# Patient Record
Sex: FEMALE | Race: WHITE | Employment: FULL TIME | URBAN - METROPOLITAN AREA
[De-identification: names, ages, dates, MRNs, and addresses within clinical notes are randomized per-mention and may not be internally consistent; named-entity substitution may affect disease eponyms.]

---

## 2017-11-27 ENCOUNTER — HOSPITAL ENCOUNTER (OUTPATIENT)
Dept: VASCULAR SURGERY | Age: 52
Discharge: HOME | End: 2017-11-27
Attending: NURSE PRACTITIONER
Payer: COMMERCIAL

## 2017-11-27 DIAGNOSIS — I77.9 DISEASE OF ARTERY (HCC): ICD-10-CM

## 2017-11-27 PROCEDURE — 93880 EXTRACRANIAL BILAT STUDY: CPT

## 2017-11-27 NOTE — PROCEDURES
Yuliya 87  *** FINAL REPORT ***    Name: Cammy Torres  MRN: [id number]    Outpatient  : 25 May 1965  HIS Order #: [id number]  [address] [facility] Visit #: [id number]  Date: 2017    TYPE OF TEST: Cerebrovascular Duplex    REASON FOR TEST  Sudden visual loss, Left Eye that resolved after 1 minute    Right Carotid:-             Proximal               Mid                 Distal  cm/s  Systolic  Diastolic  Systolic  Diastolic  Systolic  Diastolic  CCA:     50.7      19.3                            65.8      23.0  Bulb:  ECA:     63.6      12.1  ICA:     47.9      15.6       72.9      34.5       99.1      46.1  ICA/CCA:  0.7       0.7    ICA Stenosis: <50%    Right Vertebral:-  Finding: Antegrade  Sys:       37.6  Magnolia:       12.4    Right Subclavian:    Left Carotid:-            Proximal                Mid                 Distal  cm/s  Systolic  Diastolic  Systolic  Diastolic  Systolic  Diastolic  CCA:     00.9      28.0                            83.1      27.0  Bulb:  ECA:     74.3      12.8  ICA:     75.4      30.3       85.3      35.8      103.0      39.6  ICA/CCA:  0.9       1.1    ICA Stenosis: <50%    Left Vertebral:-  Finding: Antegrade  Sys:       46.4  Magnolia:       20.1    Left Subclavian:    INTERPRETATION/FINDINGS  PROCEDURE:  Evaluation of the extracranial cerebrovascular arteries  with ultrasound (B-mode imaging, pulsed Doppler, color Doppler). Includes the common carotid, internal carotid, external carotid, and  vertebral arteries. FINDINGS: Minimal homogeneous plaque in the bilateral bulb and  bilateral ICA. IMPRESSION: Findings are consistent with 0-49% stenosis of the right  internal carotid and 0-49% stenosis of the left internal carotid. Vertebrals are patent with antegrade flow. ADDITIONAL COMMENTS    INCIDENTAL FINDINGS: Nodules identified on bilateral lobes of the  thyroid. Alternate imaging method suggested.     I have personally reviewed the data relevant to the interpretation of  this  study. TECHNOLOGIST: SAM Verdugo  Signed: 11/27/2017 02:17 PM    PHYSICIAN: Jill Tony.  Alexander Oneil MD  Signed: 11/28/2017 09:15 AM

## 2017-11-29 ENCOUNTER — OFFICE VISIT (OUTPATIENT)
Dept: NEUROLOGY | Age: 52
End: 2017-11-29

## 2017-11-29 VITALS — OXYGEN SATURATION: 98 % | DIASTOLIC BLOOD PRESSURE: 70 MMHG | SYSTOLIC BLOOD PRESSURE: 108 MMHG | HEART RATE: 81 BPM

## 2017-11-29 DIAGNOSIS — H53.132 ACUTE LOSS OF VISION, LEFT: Primary | ICD-10-CM

## 2017-11-29 NOTE — MR AVS SNAPSHOT
Visit Information Date & Time Provider Department Dept. Phone Encounter #  
 11/29/2017 10:00 AM Hortensia Diego, [facility] [phone] [id number] Follow-up Instructions Return for review of results. Upcoming Health Maintenance Date Due Hepatitis C Screening 1965 DTaP/Tdap/Td series (1 - Tdap) 5/25/1985 PAP AKA CERVICAL CYTOLOGY 5/25/1985 BREAST CANCER SCRN MAMMOGRAM 5/25/2014 FOBT Q 1 YEAR AGE 50-75 5/25/2014 Influenza Age 5 to Adult 8/1/2017 Allergies as of 11/29/2017  Review Complete On: 11/29/2017 By: Hortensia Diego MD  
 No Known Allergies Current Immunizations  Never Reviewed No immunizations on file. Not reviewed this visit You Were Diagnosed With   
  
 Codes Comments Acute loss of vision, left    -  Primary ICD-10-CM: H53.132 ICD-9-CM: 368.11 Vitals BP Pulse SpO2 Smoking Status 108/70 81 98% Never Smoker Your Updated Medication List  
  
   
This list is accurate as of: 11/29/17 10:21 AM.  Always use your most recent med list.  
  
  
  
  
 MULTI VITAMIN PO Take  by mouth. Follow-up Instructions Return for review of results. To-Do List   
 11/29/2017 ECHO:  ECHO 2D ADULT Patient Instructions PRESCRIPTION REFILL POLICY [facility] Statement to Patients April 1, 2014 In an effort to ensure the large volume of patient prescription refills is processed in the most efficient and expeditious manner, we are asking our patients to assist us by calling your Pharmacy for all prescription refills, this will include also your  Mail Order Pharmacy. The pharmacy will contact our office electronically to continue the refill process. Please do not wait until the last minute to call your pharmacy. We need at least 48 hours (2days) to fill prescriptions.  We also encourage you to call your pharmacy before going to  your prescription to make sure it is ready. With regard to controlled substance prescription refill requests (narcotic refills) that need to be picked up at our office, we ask your cooperation by providing us with at least 72 hours (3days) notice that you will need a refill. We will not refill narcotic prescription refill requests after 4:00pm on any weekday, Monday through Thursday, or after 2:00pm on Fridays, or on the weekends. We encourage everyone to explore another way of getting your prescription refill request processed using [employer], our patient web portal through our electronic medical record system. [employer] is an efficient and effective way to communicate your medication request directly to the office and  downloadable as an yumiko on your smart phone . [employer] also features a review functionality that allows you to view your medication list as well as leave messages for your physician. Are you ready to get connected? If so please review the attatched instructions or speak to any of our staff to get you set up right away! Thank you so much for your cooperation. Should you have any questions please contact our Practice Administrator. The Physicians and Staff,  [facility] Introducing [facility] SERVICES! [facility] introduces [employer] patient portal. Now you can access parts of your medical record, email your doctor's office, and request medication refills online. 1. In your internet browser, go to [email or web address] 2. Click on the First Time User? Click Here link in the Sign In box. You will see the New Member Sign Up page. 3. Enter your [employer] Access Code exactly as it appears below. You will not need to use this code after youve completed the sign-up process. If you do not sign up before the expiration date, you must request a new code. · [employer] Access Code:  [email or web address] 
 Expires: 2/25/2018 11:28 AM 
 
4. Enter the last four digits of your Social Security Number (xxxx) and Date of Birth (mm/dd/yyyy) as indicated and click Submit. You will be taken to the next sign-up page. 5. Create a Number 100 ID. This will be your Number 100 login ID and cannot be changed, so think of one that is secure and easy to remember. 6. Create a Number 100 password. You can change your password at any time. 7. Enter your Password Reset Question and Answer. This can be used at a later time if you forget your password. 8. Enter your e-mail address. You will receive e-mail notification when new information is available in [address] 9. Click Sign Up. You can now view and download portions of your medical record. 10. Click the Download Summary menu link to download a portable copy of your medical information. If you have questions, please visit the Frequently Asked Questions section of the Number 100 website. Remember, [employer] 100 is NOT to be used for urgent needs. For medical emergencies, dial 911. Now available from your iPhone and Android! Please provide this summary of care documentation to your next provider. Your primary care clinician is listed as NONE. If you have any questions after today's visit, please call [phone].

## 2017-11-29 NOTE — PATIENT INSTRUCTIONS
[facility]   Statement to Patients  April 1, 2014      In an effort to ensure the large volume of patient prescription refills is processed in the most efficient and expeditious manner, we are asking our patients to assist us by calling your Pharmacy for all prescription refills, this will include also your  Mail Order Pharmacy. The pharmacy will contact our office electronically to continue the refill process. Please do not wait until the last minute to call your pharmacy. We need at least 48 hours (2days) to fill prescriptions. We also encourage you to call your pharmacy before going to  your prescription to make sure it is ready. With regard to controlled substance prescription refill requests (narcotic refills) that need to be picked up at our office, we ask your cooperation by providing us with at least 72 hours (3days) notice that you will need a refill. We will not refill narcotic prescription refill requests after 4:00pm on any weekday, Monday through Thursday, or after 2:00pm on Fridays, or on the weekends. We encourage everyone to explore another way of getting your prescription refill request processed using [employer], our patient web portal through our electronic medical record system. [employer] is an efficient and effective way to communicate your medication request directly to the office and  downloadable as an yumiko on your smart phone . [employer] also features a review functionality that allows you to view your medication list as well as leave messages for your physician. Are you ready to get connected? If so please review the attatched instructions or speak to any of our staff to get you set up right away! Thank you so much for your cooperation. Should you have any questions please contact our Practice Administrator.     The Physicians and Staff,  [facility]

## 2017-11-29 NOTE — PROGRESS NOTES
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 HPI:     2 weeks ago, was on a vacation on a cabin at [place], [place], around noon, had lunch, was just sitting and telling a story, when suddenly noted complete loss of vision of the L eye lasting for 45 to 60 secs . (-) eye pain (-) headache    (-) chest pain  (-) palpitation  (-) SOB  (-) dysarthria  (-) weakness/ numbness    Patient came homewas not feeling well, felt congested and middle back lower back pain. [facility] were said to be okay. Had no event since. ROS  All other systems reviewed and are negative  (-) fever    Past Medical Hx  History reviewed. No pertinent past medical history. Social Hx  Social History     Social History    Marital status:      Spouse name: N/A    Number of children: N/A    Years of education: N/A     Social History Main Topics    Smoking status: Never Smoker    Smokeless tobacco: Never Used    Alcohol use Yes    Drug use: None    Sexual activity: Not Asked     Other Topics Concern    None     Social History Narrative    None   1 glass of wine/ week    Family Hx  History reviewed. No pertinent family history. Stroke    ALLERGIES  No Known Allergies    CURRENT MEDS  Current Outpatient Prescriptions   Medication Sig Dispense Refill    MULTIVIT-MINERALS/FERROUS FUM (MULTI VITAMIN PO) Take  by mouth. PREVIOUS WORKUP: (reviewed)  IMAGING:    CT Results (recent):  No results found for this or any previous visit. MRI Results (recent):  No results found for this or any previous visit. IR Results (recent):  No results found for this or any previous visit.     VAS/US Results (recent):    Results from [facility] encounter on 11/27/17   DUPLEX CAROTID BILATERAL        LABS (reviewed)  No results found for this or any previous visit. Physical Exam:     Visit Vitals    /70    Pulse 81    SpO2 98%     General:  Alert, cooperative, no distress. Head:  Normocephalic, without obvious abnormality, atraumatic. Eyes:  Conjunctivae/corneas clear. Lungs:  Heart:   Non labored breathing  Regular rate and rhythm, no carotid bruits   Abdomen:   Soft, non-distended   Extremities: Extremities normal, atraumatic, no cyanosis or edema. Pulses: 2+ and symmetric all extremities. Skin: Skin color, texture, turgor normal. No rashes or lesions. Neurologic Exam     Gen: Attention normal             Language: naming, repetition, fluency normal             Memory: intact recent and remote memory  Cranial Nerves:  I: smell Not tested   II: visual fields Full to confrontation   II: pupils Equal, round, reactive to light   II: optic disc No papilledema   III,VII: ptosis none   III,IV,VI: extraocular muscles  Full ROM   V: mastication normal   V: facial light touch sensation  normal   VII: facial muscle function   symmetric   VIII: hearing symmetric   IX: soft palate elevation  normal   XI: trapezius strength  5/5   XI: sternocleidomastoid strength 5/5   XI: neck flexion strength  5/5   XII: tongue  midline     Motor: normal bulk and tone, no tremor              Strength: 5/5 all four extremities  Sensory: intact to LT, PP, vibration, and JPS  Reflexes: 2+ throughout; Down going toes  Coordination: Good FTN and HTS  Gait: normal gait including tandem            Impression:     Amelie Keen is a healthy  47 y.o. female who 2 weeks ago, was on a vacation on a cabin at [place], [place], around noon,on the 3rd day of their stay, had lunch, was just sitting and telling a story, when suddenly noted complete loss of vision of the L eye lasting for 45 to 60 secs .  (-) eye pain (-) headache (-) chest pain (-) palpitation (-) SOB (-) dysarthria (-) weakness/ numbness. Consideration includes transient loss of vision due to high altitude. However, patient should be evaluated for cardioembolic cause of her symptoms. RECOMMENDATIONS  1. Will do echo. Patient to call for results  2. Will observe symptoms if above is negative  3. Suggest to also have formal eye exam c/o ophthalmology      Ms. Pavon has a reminder for a \"due or due soon\" health maintenance. I have asked that she contact her primary care provider for follow-up on this health maintenance.     Follow-up Disposition: Not on File        Thank you for the consultation      Rehan Salas MD  Diplomate, American Board of Psychiatry and Neurology  Diplomate, Neuromuscular Medicine  Diplomate, American Board of Electrodiagnostic Medicine    Greater than 50% of time spent counseling patient      CC: None  Fax: None

## 2017-12-07 ENCOUNTER — HOSPITAL ENCOUNTER (OUTPATIENT)
Dept: NON INVASIVE DIAGNOSTICS | Age: 52
Discharge: HOME | End: 2017-12-07
Attending: PSYCHIATRY & NEUROLOGY
Payer: COMMERCIAL

## 2017-12-07 ENCOUNTER — TELEPHONE (OUTPATIENT)
Dept: NEUROLOGY | Age: 52
End: 2017-12-07

## 2017-12-07 DIAGNOSIS — H53.132 ACUTE LOSS OF VISION, LEFT: ICD-10-CM

## 2017-12-07 PROCEDURE — 93306 TTE W/DOPPLER COMPLETE: CPT

## 2020-12-21 NOTE — TELEPHONE ENCOUNTER
Informed patient echocardiogram is normal per Dr. Lieutenant Chu. Patient verbalized understanding.
(0) indicator not present